# Patient Record
Sex: FEMALE | Race: WHITE
[De-identification: names, ages, dates, MRNs, and addresses within clinical notes are randomized per-mention and may not be internally consistent; named-entity substitution may affect disease eponyms.]

---

## 2019-11-01 ENCOUNTER — HOSPITAL ENCOUNTER (EMERGENCY)
Dept: HOSPITAL 75 - ER | Age: 19
Discharge: LEFT BEFORE DISCHARGE | End: 2019-11-01
Payer: SELF-PAY
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
## 2019-11-01 DIAGNOSIS — S80.11XA: Primary | ICD-10-CM

## 2019-11-01 NOTE — NUR
Registration staff reports seeing pt walk out approx 10 min after checking in 
and has not seen pt come back.